# Patient Record
Sex: FEMALE | Race: WHITE | NOT HISPANIC OR LATINO | Employment: FULL TIME | URBAN - METROPOLITAN AREA
[De-identification: names, ages, dates, MRNs, and addresses within clinical notes are randomized per-mention and may not be internally consistent; named-entity substitution may affect disease eponyms.]

---

## 2023-04-25 ENCOUNTER — MEDICAL CORRESPONDENCE (OUTPATIENT)
Dept: HEALTH INFORMATION MANAGEMENT | Facility: CLINIC | Age: 33
End: 2023-04-25
Payer: COMMERCIAL

## 2023-04-27 ENCOUNTER — TELEPHONE (OUTPATIENT)
Dept: OPHTHALMOLOGY | Facility: CLINIC | Age: 33
End: 2023-04-27
Payer: COMMERCIAL

## 2023-04-27 NOTE — TELEPHONE ENCOUNTER
"Spoke with patient regarding scheduling with  within 2 months for \"JANET, increased proptosis, marco antonio/TEDphotos\". Lab done in [place] in [facility] recently and scheduled on May 2,2023.  No Imaging to obtain.-Per ELSA Cisneros. Scheduled patient after completing registration.  Patient was provided appointment information over the phone.-Per Patient  "

## 2023-04-28 NOTE — TELEPHONE ENCOUNTER
"FUTURE VISIT INFORMATION      FUTURE VISIT INFORMATION:    Date: 5/8/23    Time: 8:00am    [place] [facility] [place]  REFERRAL INFORMATION:    Referring provider:  ELSA Cisneros    Referring providers clinic:  [facility]    Reason for visit/diagnosis  JANET, increased proptosis, marco antonio/TEDphotos\".Lab done in [place] in [facility] and again on May 2,2023. No Imaging to obtain.    RECORDS REQUESTED FROM:       Clinic name Comments Records Status Imaging Status   [facility]/ [facility] Ov/notes 4/18/23-12/29/22  Labs drawn 4/12/23 Care Everywhere        "

## 2023-05-08 ENCOUNTER — OFFICE VISIT (OUTPATIENT)
Dept: OPHTHALMOLOGY | Facility: CLINIC | Age: 33
End: 2023-05-08
Payer: COMMERCIAL

## 2023-05-08 ENCOUNTER — PRE VISIT (OUTPATIENT)
Dept: OPHTHALMOLOGY | Facility: CLINIC | Age: 33
End: 2023-05-08

## 2023-05-08 DIAGNOSIS — E07.9 THYROID EYE DISEASE: Primary | ICD-10-CM

## 2023-05-08 DIAGNOSIS — H05.20 PROPTOSIS: ICD-10-CM

## 2023-05-08 DIAGNOSIS — H57.89 THYROID EYE DISEASE: Primary | ICD-10-CM

## 2023-05-08 PROCEDURE — 99243 OFF/OP CNSLTJ NEW/EST LOW 30: CPT | Mod: GC | Performed by: OPHTHALMOLOGY

## 2023-05-08 PROCEDURE — 92285 EXTERNAL OCULAR PHOTOGRAPHY: CPT | Mod: GC | Performed by: OPHTHALMOLOGY

## 2023-05-08 RX ORDER — LORAZEPAM 0.5 MG/1
0.5 TABLET ORAL
COMMUNITY
Start: 2022-12-14

## 2023-05-08 RX ORDER — PROPRANOLOL HYDROCHLORIDE 20 MG/1
20 TABLET ORAL
COMMUNITY
Start: 2023-04-17

## 2023-05-08 RX ORDER — METHIMAZOLE 5 MG/1
10 TABLET ORAL
COMMUNITY
Start: 2023-04-19

## 2023-05-08 RX ORDER — ALBUTEROL SULFATE 90 UG/1
2 AEROSOL, METERED RESPIRATORY (INHALATION)
COMMUNITY
Start: 2022-12-29

## 2023-05-08 ASSESSMENT — CONF VISUAL FIELD
OD_INFERIOR_TEMPORAL_RESTRICTION: 0
METHOD: COUNTING FINGERS
OS_SUPERIOR_NASAL_RESTRICTION: 0
OS_NORMAL: 1
OD_INFERIOR_NASAL_RESTRICTION: 0
OD_NORMAL: 1
OD_SUPERIOR_NASAL_RESTRICTION: 0
OS_INFERIOR_NASAL_RESTRICTION: 0
OS_INFERIOR_TEMPORAL_RESTRICTION: 0
OS_SUPERIOR_TEMPORAL_RESTRICTION: 0
OD_SUPERIOR_TEMPORAL_RESTRICTION: 0

## 2023-05-08 ASSESSMENT — TONOMETRY
OD_IOP_MMHG: 27
IOP_METHOD: ICARE
OS_IOP_MMHG: 25

## 2023-05-08 ASSESSMENT — MARGIN REFLEX DISTANCE
OD_MRD2: 5
OS_MRD1: 4
OS_MRD2: 5
OD_MRD1: 5

## 2023-05-08 ASSESSMENT — EXTERNAL EXAM - RIGHT EYE: OD_EXAM: PROPTOSIS

## 2023-05-08 ASSESSMENT — CUP TO DISC RATIO
OS_RATIO: 0.45
OD_RATIO: 0.4

## 2023-05-08 ASSESSMENT — LAGOPHTHALMOS
OD_LAGOPHTHALMOS: 0
OS_LAGOPHTHALMOS: 0

## 2023-05-08 ASSESSMENT — SLIT LAMP EXAM - LIDS
COMMENTS: NORMAL
COMMENTS: UPPER LID RETRACTION

## 2023-05-08 ASSESSMENT — VISUAL ACUITY
OS_CC: 20/20
OD_CC+: -2
METHOD: SNELLEN - LINEAR
OD_CC: 20/20
CORRECTION_TYPE: CONTACTS

## 2023-05-08 ASSESSMENT — EXTERNAL EXAM - LEFT EYE: OS_EXAM: NORMAL

## 2023-05-08 NOTE — LETTER
2023         RE:  :  MRN: Dk Wilkerson  1990  [id number]     Dear Ace,    Thank you for asking me to see your patient, Dk Wilkerson, for an oculoplastic   consultation.  My assessment and plan are below.  For further details, please see my attached clinic note.    Dk Wilkerson is a 32 year old female with the following diagnoses:   1. Thyroid eye disease    2. Proptosis         Mild thyroid eye disease  - ASHLEY , patient is mostly bothered by proptosis right eye  - she remains hyperthyroid but is working with her endocrinologist to adjust her methimazole dose. Consider thyroidectomy.   - start selenium 100mg qdaily  - follow up 4 months in [facility] clinic       Again, thank you for allowing me to participate in the care of your patient.      Sincerely,    Silas Martines MD  Department of Ophthalmology and Visual Neurosciences  [facility]    CC: Westley Bello NP  [address], [address] [address]  Via Fax: [phone]

## 2023-05-08 NOTE — NURSING NOTE
Chief Complaints and History of Present Illnesses   Patient presents with     Consult For     Pt here for JANET consult, referred by Dr Westley Bello.     Chief Complaint(s) and History of Present Illness(es)     Consult For            Laterality: both eyes    Comments: Pt here for JANET consult, referred by Dr Westley Bello.          Comments    Pt has increased proptosis. Pt has not been formally dx with JANET- pt was seen by a PA. Pt states on the 21st of April 2023- she woke up with her eye bulging. Pt has hx of migraines. Pt has hx of Graves disease.   Richelle Sanches, COA on 5/8/2023 at 7:40 AM

## 2023-05-08 NOTE — PROGRESS NOTES
Chief Complaints and History of Present Illnesses   Patient presents with     Consult For     Pt here for JANET consult, referred by Dr Westley Bello.     Chief Complaint(s) and History of Present Illness(es)     Consult For    In both eyes. Additional comments: Pt here for JANET consult, referred by Dr Westley Bello.           Comments    Pt has increased proptosis. Pt has not been formally dx with JANET- pt was   seen by a PA. Pt states on the 21st of April 2023- she woke up with her   eye bulging. Pt has hx of migraines. Pt has hx of Graves disease.   Richelle Sanches, COA on 5/8/2023 at 7:40 AM           Assessment & Plan     HPI: new patient here for evaluation of JANET. She was diagnosed with graves disease December 2022/january 2023 due to feelings of panic attacks and was worked up found to have elevated TSI. She is on methimazole but remains hyperthyroid so her dose is being adjusted.    April 2023 she began noticing bulging of the right eye ad notices some fluctuation. Notices some dry eye symptoms but states she has a long standing of dry eye. No diplopia, pain with EOMs. Patient is interested in a thyroidectomy.    Not a smoker    Referring physician: Dr. Bello (endocrinology)    Thyroid history:  Diagnosed when? December 2022  DUKES: n/a  Thyroidectomy: n/a    TSI (date):5 (12/29/22)      Previous results:n/a    Eye symptoms (since when):   Proptosis (better/worse/same since last visit): yes, initial   Diplopia(better/worse/same since last visit): no  Eyelid retraction(better/worse/same since last visit): no  Tearing(better/worse/same since last visit): no  Redness (better/worse/same since last visit): no  Pain ((better/worse/same since last visit): no  Pain to move the eyes (better/worse/same since last visit): no  Blurred vision: no    Ocular history:   Orbital decompression (date, details): n/a  Strabismus surgery (date, details): n/a  Eyelid surgery (date, details): n/a    Exam:   Esthela  (base):103/22/20     Better/worse same: initial  Strabismus (better/worse/same): initial  Eyelid retraction (better/worse/same): RUL    ASHLEY Score:  1. Spontaneous orbital pain.     0  2. Gaze evoked orbital pain.     0  3. Eyelid swelling due to active thyroid eye disease  1  4. Eyelid erythema.      0  5. Conjunctival redness due to active thyroid eye disease . 0  6. Chemosis.        0  7. Inflammation of caruncle OR plica.   0    ASHLEY SCORE = 1/7    Pino Diplopia Score = 0  0 = no diplopia  1 = intermittent (when tired, upon waking, end of day etc)  2 = inconstant (extreme gazes)  3 = constant      Dk Wilkerson is a 32 year old female with the following diagnoses:   1. Thyroid eye disease    2. Proptosis         Mild thyroid eye disease  - ASHLEY 1/7, patient is mostly bothered by proptosis right eye  - she remains hyperthyroid but is working with her endocrinologist to adjust her methimazole dose  - start selenium 100mg qdaily  - follow up 4 months in [facility] clinic                Altagracia Workman MD  Oculoplastic Surgery Fellow      Attending Physician Attestation:  I have seen and examined this patient with the fellow .  I have confirmed and edited as necessary the chief complaint(s), history of present illness, review of systems, relevant history, and examination findings as documented by others.  I have personally reviewed the relevant tests, images, and reports as documented above.  I have confirmed and edited as necessary the assessment and plan and agree with this note.    - Silas Martines MD 8:36 AM 5/8/2023

## 2023-05-08 NOTE — TELEPHONE ENCOUNTER
FUTURE VISIT INFORMATION      FUTURE VISIT INFORMATION:    Date: 7/3/23    Time: 3:20PM    [place] [facility] [place]  REFERRAL INFORMATION:    Referring provider:  Silas Martines MD    Referring providers clinic:  [facility]     Reason for visit/diagnosis  Also schedule with KEVIN Patel for thyroidectomy eval-Per . Records in [facility].-Appt Per PT    RECORDS REQUESTED FROM:       Clinic name Comments Records Status Imaging Status   [facility]  5/8/23- NOTE WITH Silas Martines MD [facility]  1/10/23- [place] THYROID   1/18/23, 12/29/22 - NOTE WITH Westley Bello MD   CE  PENDING REQ

## 2023-07-03 ENCOUNTER — PRE VISIT (OUTPATIENT)
Dept: OTOLARYNGOLOGY | Facility: CLINIC | Age: 33
End: 2023-07-03

## 2023-10-16 ENCOUNTER — HOSPITAL ENCOUNTER (OUTPATIENT)
Dept: CT IMAGING | Facility: CLINIC | Age: 33
Discharge: HOME | End: 2023-10-16
Attending: OPHTHALMOLOGY
Payer: COMMERCIAL

## 2023-10-16 ENCOUNTER — OFFICE VISIT (OUTPATIENT)
Dept: OPHTHALMOLOGY | Facility: CLINIC | Age: 33
End: 2023-10-16
Attending: OPHTHALMOLOGY
Payer: COMMERCIAL

## 2023-10-16 DIAGNOSIS — E07.9 THYROID EYE DISEASE: Primary | ICD-10-CM

## 2023-10-16 DIAGNOSIS — E07.9 THYROID EYE DISEASE: ICD-10-CM

## 2023-10-16 DIAGNOSIS — H51.21 INTERNUCLEAR OPHTHALMOPLEGIA OF RIGHT EYE: ICD-10-CM

## 2023-10-16 DIAGNOSIS — H50.111 EXOTROPIA, RIGHT EYE: ICD-10-CM

## 2023-10-16 DIAGNOSIS — H57.89 THYROID EYE DISEASE: Primary | ICD-10-CM

## 2023-10-16 DIAGNOSIS — H50.21 HYPOTROPIA OF RIGHT EYE: ICD-10-CM

## 2023-10-16 DIAGNOSIS — H57.89 THYROID EYE DISEASE: ICD-10-CM

## 2023-10-16 PROCEDURE — 70480 CT ORBIT/EAR/FOSSA W/O DYE: CPT | Mod: 26 | Performed by: STUDENT IN AN ORGANIZED HEALTH CARE EDUCATION/TRAINING PROGRAM

## 2023-10-16 PROCEDURE — 99211 OFF/OP EST MAY X REQ PHY/QHP: CPT | Mod: 25 | Performed by: OPHTHALMOLOGY

## 2023-10-16 PROCEDURE — 92060 SENSORIMOTOR EXAMINATION: CPT | Performed by: OPHTHALMOLOGY

## 2023-10-16 PROCEDURE — 70480 CT ORBIT/EAR/FOSSA W/O DYE: CPT

## 2023-10-16 PROCEDURE — 99214 OFFICE O/P EST MOD 30 MIN: CPT | Mod: GC | Performed by: OPHTHALMOLOGY

## 2023-10-16 RX ORDER — LEVOTHYROXINE SODIUM 200 UG/1
TABLET ORAL
COMMUNITY
Start: 2023-07-05

## 2023-10-16 ASSESSMENT — CONF VISUAL FIELD
OD_SUPERIOR_NASAL_RESTRICTION: 0
OD_SUPERIOR_TEMPORAL_RESTRICTION: 0
OS_SUPERIOR_TEMPORAL_RESTRICTION: 0
OD_INFERIOR_NASAL_RESTRICTION: 0
OS_INFERIOR_TEMPORAL_RESTRICTION: 0
OD_NORMAL: 1
OS_NORMAL: 1
OD_INFERIOR_TEMPORAL_RESTRICTION: 0
OS_INFERIOR_NASAL_RESTRICTION: 0
OS_SUPERIOR_NASAL_RESTRICTION: 0
METHOD: COUNTING FINGERS

## 2023-10-16 ASSESSMENT — REFRACTION_WEARINGRX
OS_CYLINDER: +1.50
OD_AXIS: 115
SPECS_TYPE: SVL
OD_SPHERE: -5.00
OS_SPHERE: -6.50
OS_AXIS: 060
OD_CYLINDER: +1.00

## 2023-10-16 ASSESSMENT — VISUAL ACUITY
CORRECTION_TYPE: GLASSES
METHOD: SNELLEN - LINEAR
OS_CC: 20/20
OD_CC: 20/20

## 2023-10-16 ASSESSMENT — SLIT LAMP EXAM - LIDS
COMMENTS: UL RETRACTION
COMMENTS: NORMAL

## 2023-10-16 ASSESSMENT — EXTERNAL EXAM - LEFT EYE: OS_EXAM: NORMAL

## 2023-10-16 ASSESSMENT — MARGIN REFLEX DISTANCE
OS_MRD1: 4
OD_MRD1: 5
OS_MRD2: 5
OD_MRD2: 5

## 2023-10-16 ASSESSMENT — LAGOPHTHALMOS
OD_LAGOPHTHALMOS: 0
OS_LAGOPHTHALMOS: 0

## 2023-10-16 ASSESSMENT — EXTERNAL EXAM - RIGHT EYE: OD_EXAM: PROPTOSIS

## 2023-10-16 ASSESSMENT — TONOMETRY
OS_IOP_MMHG: 20
OD_IOP_MMHG: 22
IOP_METHOD: ICARE B/B JC

## 2023-10-16 NOTE — NURSING NOTE
Chief Complaint(s) and History of Present Illness(es)       Thyroid Disease              Laterality: both eyes    Associated symptoms: dryness.  Negative for double vision and eye pain    Comments: Thyroidectomy on 7/5/23. Will occasionally have very dry eyes and they will be red, uses systane and it really helps. Eyes do feel better since surgery. Bulging of RE seems the same that it did at [facility]. Hormone levels seem much more stable now.  No vision changes. Does not note any diplopia. Eyes do not hurt but feel slightly stiff occasionally. Not a nuisance.  Does note that blood pressure is still slightly elevated but better than before surgery.  No longer wearing CL's, too dry.              Comments    Levothyroxine, Sertraline. Stopped Methimazole after thyroidectomy.  Took Selenium for a few months after [facility], had some mood swings and wasn't sure if it was causing it. Better once thyroid was removed. Wants to start taking again.  Labs 8/7/23: T3 109, T4 1.10  Has been baking own bread lately, trying to stop eating as much processed food.  Inf: pt

## 2023-10-16 NOTE — PROGRESS NOTES
Thyroid Eye Disease Follow-Up     Interval HPI (10/16/23): Underwent thyroidectomy on 7/5/23. Since then, she has stopped methimazole and transitioned to levothyroxine; she stopped selenium in the early summer. Since her surgery, she notes that her systemic symptoms have improved substantially. Her ocular symptoms have remained largely stable. Her right eye bulging is stable as is her bilateral eye irritation. No new vision changes, binocular diplopia, pain at rest or with motility.    HPI: new patient here for evaluation of JANET. She was diagnosed with graves disease December 2022/january 2023 due to feelings of panic attacks and was worked up found to have elevated TSI. She is on methimazole but remains hyperthyroid so her dose is being adjusted.    April 2023 she began noticing bulging of the right eye ad notices some fluctuation. Notices some dry eye symptoms but states she has a long standing of dry eye. No diplopia, pain with EOMs. Patient is interested in a thyroidectomy.    Not a smoker    Referring physician: Dr. Bello (endocrinology)    Thyroid history:  Diagnosed when? December 2022  DUKES: n/a  Thyroidectomy: 7/2023    TSI (date):5 (12/29/22)      Previous results:n/a    Eye symptoms (since when):   Proptosis (better/worse/same since last visit): same  Diplopia(better/worse/same since last visit): no  Eyelid retraction(better/worse/same since last visit): no  Tearing(better/worse/same since last visit): no  Redness (better/worse/same since last visit): no  Pain (better/worse/same since last visit): no  Pain to move the eyes (better/worse/same since last visit): no  Blurred vision: no    Ocular history:   Orbital decompression (date, details): n/a  Strabismus surgery (date, details): n/a  Eyelid surgery (date, details): n/a    Exam:   Esthela (base):103/22/20     Better/worse same: same  Strabismus (better/worse/same): RXT in left gaze, mild supraduction deficit; first complete sensorimotor exam  Eyelid  retraction (better/worse/same): same    ASHLEY Score  1. Spontaneous orbital pain.     0  2. Gaze evoked orbital pain.     0  3. Eyelid swelling due to active thyroid eye disease  1  4. Eyelid erythema.       0  5. Conjunctival redness due to active thyroid eye disease . 1  6. Chemosis.        0  7. Inflammation of caruncle OR plica.    1    Patients assessed after follow-up can be scored out of 10 by  including items 8-10.    8. Increase of > 2mm in proptosis.    0   9. Decrease in uniocular excursion in any direction of > 8 . 0  10. Decrease of acuity equivalent to 1 Snellen line.  0    ASHLEY SCORE = 3/10    Pino Diplopia Score = 2  0 = no diplopia  1 = intermittent (when tired, upon waking, end of day etc)  2 = inconstant (extreme gazes)  3 = constant    Dk Wilkerson is a 32 year old female with the following diagnoses:   1. Thyroid eye disease    2. Exotropia, right eye    3. Hypotropia of right eye       Mild, active thyroid eye disease  - S/p thyroidectomy (7/2023), has been doing well systemically since then. Ocular symptoms are stable. Exam demonstrates RXT (first measurement today), stable proptosis and RUL retraction, and new signs of surface inflammation.   - ASHLEY 3/10, patient is not bothered by eye disease symptoms and signs at this time and would prefer to continue to observe symptoms.   - Did not tolerate selenium 100mg qdaily  - Continue AT PRN   - Follow up 4-6 months in [facility]ET clinic    Exotropia is a bit unusual in Thyroid eye disease.  Will obtain a CT orbit with [place] protocol.       Addendum: Her CT was unremarkable.  Will obtain MRI brain.             Attending Physician Attestation:  Complete documentation of historical and exam elements from today's encounter can be found in the full encounter summary report (not reduplicated in this progress note).  I personally obtained the chief complaint(s) and history of present illness.  I confirmed and edited as necessary the review of systems, past  medical/surgical history, family history, social history, and examination findings as documented by others; and I examined the patient myself.  I personally reviewed the relevant tests, images, and reports as documented above.  I formulated and edited as necessary the assessment and plan and discussed the findings and management plan with the patient and family. I personally reviewed the ophthalmic test(s) associated with this encounter, agree with the interpretation(s) as documented by the resident/fellow, and have edited the corresponding report(s) as necessary.  - Abraham Soria MD  Oculoplastic Surgery Fellow, [facility]

## 2023-10-17 ENCOUNTER — TELEPHONE (OUTPATIENT)
Dept: OPHTHALMOLOGY | Facility: CLINIC | Age: 33
End: 2023-10-17
Payer: COMMERCIAL

## 2023-10-17 NOTE — TELEPHONE ENCOUNTER
Spoke to patient regarding CT orbits and that Dr. Gutierrez would like to do MRI.  The patient would like to have done at [facility] in [place].  Faxed orders and will ask that they reach out to patient to schedule.     Eugenia Lambert on 10/17/2023 at 2:22 PM

## 2023-10-26 ENCOUNTER — TELEPHONE (OUTPATIENT)
Dept: OPHTHALMOLOGY | Facility: CLINIC | Age: 33
End: 2023-10-26
Payer: COMMERCIAL

## 2023-10-26 NOTE — TELEPHONE ENCOUNTER
Spoke to patient.  Her MRI was normal.  As Dr. Gutierrez mentioned it is unusual for thyroid eye disease to cause an eye misalignment such as hers, but in her case it appears as though it is due to thyroid eye disease      Eugenia Lambert on 10/26/2023 at 4:07 PM

## 2023-12-31 ENCOUNTER — HEALTH MAINTENANCE LETTER (OUTPATIENT)
Age: 33
End: 2023-12-31

## 2025-01-19 ENCOUNTER — HEALTH MAINTENANCE LETTER (OUTPATIENT)
Age: 35
End: 2025-01-19